# Patient Record
Sex: MALE | Race: OTHER
[De-identification: names, ages, dates, MRNs, and addresses within clinical notes are randomized per-mention and may not be internally consistent; named-entity substitution may affect disease eponyms.]

---

## 2019-06-21 ENCOUNTER — HOSPITAL ENCOUNTER (INPATIENT)
Dept: HOSPITAL 72 - EMR | Age: 49
LOS: 1 days | Discharge: HOME | DRG: 443 | End: 2019-06-22
Payer: SELF-PAY

## 2019-06-21 VITALS — SYSTOLIC BLOOD PRESSURE: 108 MMHG | DIASTOLIC BLOOD PRESSURE: 74 MMHG

## 2019-06-21 VITALS — DIASTOLIC BLOOD PRESSURE: 79 MMHG | SYSTOLIC BLOOD PRESSURE: 124 MMHG

## 2019-06-21 VITALS — DIASTOLIC BLOOD PRESSURE: 83 MMHG | SYSTOLIC BLOOD PRESSURE: 117 MMHG

## 2019-06-21 VITALS — WEIGHT: 150 LBS | HEIGHT: 64 IN | BODY MASS INDEX: 25.61 KG/M2

## 2019-06-21 VITALS — DIASTOLIC BLOOD PRESSURE: 80 MMHG | SYSTOLIC BLOOD PRESSURE: 128 MMHG

## 2019-06-21 VITALS — DIASTOLIC BLOOD PRESSURE: 76 MMHG | SYSTOLIC BLOOD PRESSURE: 135 MMHG

## 2019-06-21 DIAGNOSIS — Z88.0: ICD-10-CM

## 2019-06-21 DIAGNOSIS — E87.6: ICD-10-CM

## 2019-06-21 DIAGNOSIS — R53.1: ICD-10-CM

## 2019-06-21 DIAGNOSIS — K72.90: Primary | ICD-10-CM

## 2019-06-21 LAB
ADD MANUAL DIFF: NO
ALBUMIN SERPL-MCNC: 4 G/DL (ref 3.4–5)
ALBUMIN/GLOB SERPL: 1 {RATIO} (ref 1–2.7)
ALP SERPL-CCNC: 88 U/L (ref 46–116)
ALT SERPL-CCNC: 216 U/L (ref 12–78)
ANION GAP SERPL CALC-SCNC: 11 MMOL/L (ref 5–15)
APPEARANCE UR: CLEAR
APTT PPP: YELLOW S
AST SERPL-CCNC: 165 U/L (ref 15–37)
BASOPHILS NFR BLD AUTO: 0.9 % (ref 0–2)
BILIRUB SERPL-MCNC: 0.9 MG/DL (ref 0.2–1)
BUN SERPL-MCNC: 21 MG/DL (ref 7–18)
CALCIUM SERPL-MCNC: 9.4 MG/DL (ref 8.5–10.1)
CHLORIDE SERPL-SCNC: 94 MMOL/L (ref 98–107)
CO2 SERPL-SCNC: 28 MMOL/L (ref 21–32)
CREAT SERPL-MCNC: 0.8 MG/DL (ref 0.55–1.3)
EOSINOPHIL NFR BLD AUTO: 0.4 % (ref 0–3)
ERYTHROCYTE [DISTWIDTH] IN BLOOD BY AUTOMATED COUNT: 11.5 % (ref 11.6–14.8)
GLOBULIN SER-MCNC: 4.2 G/DL
GLUCOSE UR STRIP-MCNC: NEGATIVE MG/DL
HCT VFR BLD CALC: 42 % (ref 42–52)
HGB BLD-MCNC: 14.9 G/DL (ref 14.2–18)
KETONES UR QL STRIP: (no result)
LEUKOCYTE ESTERASE UR QL STRIP: (no result)
LYMPHOCYTES NFR BLD AUTO: 24 % (ref 20–45)
MCV RBC AUTO: 89 FL (ref 80–99)
MONOCYTES NFR BLD AUTO: 10.9 % (ref 1–10)
NEUTROPHILS NFR BLD AUTO: 63.7 % (ref 45–75)
NITRITE UR QL STRIP: NEGATIVE
PH UR STRIP: 6.5 [PH] (ref 4.5–8)
PLATELET # BLD: 332 K/UL (ref 150–450)
POTASSIUM SERPL-SCNC: 2.9 MMOL/L (ref 3.5–5.1)
PROT UR QL STRIP: (no result)
RBC # BLD AUTO: 4.73 M/UL (ref 4.7–6.1)
SODIUM SERPL-SCNC: 133 MMOL/L (ref 136–145)
SP GR UR STRIP: 1.01 (ref 1–1.03)
UROBILINOGEN UR-MCNC: 4 MG/DL (ref 0–1)
WBC # BLD AUTO: 9.8 K/UL (ref 4.8–10.8)

## 2019-06-21 PROCEDURE — 99285 EMERGENCY DEPT VISIT HI MDM: CPT

## 2019-06-21 PROCEDURE — 96365 THER/PROPH/DIAG IV INF INIT: CPT

## 2019-06-21 PROCEDURE — 84443 ASSAY THYROID STIM HORMONE: CPT

## 2019-06-21 PROCEDURE — 80329 ANALGESICS NON-OPIOID 1 OR 2: CPT

## 2019-06-21 PROCEDURE — 82140 ASSAY OF AMMONIA: CPT

## 2019-06-21 PROCEDURE — 93005 ELECTROCARDIOGRAM TRACING: CPT

## 2019-06-21 PROCEDURE — 80053 COMPREHEN METABOLIC PANEL: CPT

## 2019-06-21 PROCEDURE — 80307 DRUG TEST PRSMV CHEM ANLYZR: CPT

## 2019-06-21 PROCEDURE — 36415 COLL VENOUS BLD VENIPUNCTURE: CPT

## 2019-06-21 PROCEDURE — 70450 CT HEAD/BRAIN W/O DYE: CPT

## 2019-06-21 PROCEDURE — 81003 URINALYSIS AUTO W/O SCOPE: CPT

## 2019-06-21 PROCEDURE — 85025 COMPLETE CBC W/AUTO DIFF WBC: CPT

## 2019-06-21 NOTE — EMERGENCY ROOM REPORT
History of Present Illness


General


Chief Complaint:  Male Urogenital Problems


Source:  Patient, Friend





Present Illness


HPI


This patient is altered.  The patient is confabulating and hallucinating.  He 

is speaking normal words but not in a sensible order.  He is unable to make a 

complete sentence.  He has no specific complaint.  He is brought in by his 

friend who states that he had went to [place] and came home yesterday and 

called him and had been acting this way ever since.  He has no history of drug 

abuse or a psychiatric condition.  The friend of the patient states he is 

hallucinating and is not making sense.  There is been no recent illness.  He is 

unsure if he used drugs in [place].  He did not accompany him.  He states 

normally he has a normal mental status.


Allergies:  


Uncoded Allergies:  


     PENICILLIN (Allergy, Unknown, 6/21/19)





Patient History


Past Medical History:  unable to obtain


Past Surgical History:  unable to obtain


Pertinent Family History:  unable to obtain


Reviewed Nursing Documentation:  PMH: Agreed; PSxH: Agreed





Nursing Documentation-PMH


Past Medical History:  No Stated History





Review of Systems


All Other Systems:  limited





Physical Exam





Vital Signs








  Date Time  Temp Pulse Resp B/P (MAP) Pulse Ox O2 Delivery O2 Flow Rate FiO2


 


6/21/19 10:33 98.4 118 20 124/79 (94) 96 Room Air  








Sp02 EP Interpretation:  reviewed, normal


General Appearance:  no apparent distress, alert, GCS 15, non-toxic


Head:  normocephalic, atraumatic


Eyes:  bilateral eye normal inspection, bilateral eye PERRL


ENT:  hearing grossly normal, normal pharynx, no angioedema, normal voice


Neck:  full range of motion, supple/symm/no masses


Respiratory:  chest non-tender, lungs clear, normal breath sounds, no 

respiratory distress, no retraction, no accessory muscle use, speaking full 

sentences


Cardiovascular #1:  regular rate, rhythm, no edema


Gastrointestinal:  normal bowel sounds, non tender, soft, non-distended, no 

guarding, no rebound


Rectal:  deferred


Musculoskeletal:  back normal, gait/station normal, normal range of motion


Neurologic:  alert, responsive, motor strength/tone normal, sensory intact, 

speech normal, grossly normal


Psychiatric:  mood/affect normal, no suicidal/homicidal ideation, other - 

Psychosis, non-sensical.


Skin:  normal color, no rash, warm/dry, well hydrated





Medical Decision Making


Diagnostic Impression:  


 Primary Impression:  


 Hepatic encephalopathy


 Additional Impressions:  


 Hypokalemia


 Wernicke encephalopathy


ER Course


I suspect this patient has liver cirrhosis and possibly Warnicke 

encephalopathy.  I suspect he is altered secondary to an elevated ammonia and 

has hepatic encephalopathy.  Labs did show hypokalemia and an elevated ammonia.

  CT of the head was unremarkable.  This patient will be admitted for further 

evaluation of his altered mental status/encephalopathy.





Laboratory Tests








Test


  6/21/19


11:15 6/21/19


11:47


 


White Blood Count


  9.8 K/UL


(4.8-10.8) 


 


 


Red Blood Count


  4.73 M/UL


(4.70-6.10) 


 


 


Hemoglobin


  14.9 G/DL


(14.2-18.0) 


 


 


Hematocrit


  42.0 %


(42.0-52.0) 


 


 


Mean Corpuscular Volume 89 FL (80-99)   


 


Mean Corpuscular Hemoglobin


  31.4 PG


(27.0-31.0)  H 


 


 


Mean Corpuscular Hemoglobin


Concent 35.4 G/DL


(32.0-36.0) 


 


 


Red Cell Distribution Width


  11.5 %


(11.6-14.8)  L 


 


 


Platelet Count


  332 K/UL


(150-450) 


 


 


Mean Platelet Volume


  6.0 FL


(6.5-10.1)  L 


 


 


Neutrophils (%) (Auto)


  63.7 %


(45.0-75.0) 


 


 


Lymphocytes (%) (Auto)


  24.0 %


(20.0-45.0) 


 


 


Monocytes (%) (Auto)


  10.9 %


(1.0-10.0)  H 


 


 


Eosinophils (%) (Auto)


  0.4 %


(0.0-3.0) 


 


 


Basophils (%) (Auto)


  0.9 %


(0.0-2.0) 


 


 


Urine Color Yellow   


 


Urine Appearance Clear   


 


Urine pH 6.5 (4.5-8.0)   


 


Urine Specific Gravity


  1.010


(1.005-1.035) 


 


 


Urine Protein


  2+ (NEGATIVE)


H 


 


 


Urine Glucose (UA)


  Negative


(NEGATIVE) 


 


 


Urine Ketones


  1+ (NEGATIVE)


H 


 


 


Urine Blood


  1+ (NEGATIVE)


H 


 


 


Urine Nitrite


  Negative


(NEGATIVE) 


 


 


Urine Bilirubin


  1+ (NEGATIVE)


H 


 


 


Urine Ictotest


  Negative


(NEGATIVE) 


 


 


Urine Urobilinogen


  4 MG/DL


(0.0-1.0)  H 


 


 


Urine Leukocyte Esterase


  1+ (NEGATIVE)


H 


 


 


Urine RBC


  0-2 /HPF (0 -


0)  H 


 


 


Urine WBC


  0-2 /HPF (0 -


0) 


 


 


Urine Squamous Epithelial


Cells Occasional


/LPF 


 


 


Urine Amorphous Sediment


  Few /LPF


(NONE)  H 


 


 


Urine Bacteria


  Occasional


/HPF (NONE) 


 


 


Urine Mucus


  Few /LPF


(NONE/OCC)  H 


 


 


Sodium Level


  133 MMOL/L


(136-145)  L 


 


 


Potassium Level


  2.9 MMOL/L


(3.5-5.1)  L 


 


 


Chloride Level


  94 MMOL/L


()  L 


 


 


Carbon Dioxide Level


  28 MMOL/L


(21-32) 


 


 


Anion Gap


  11 mmol/L


(5-15) 


 


 


Blood Urea Nitrogen


  21 mg/dL


(7-18)  H 


 


 


Creatinine


  0.8 MG/DL


(0.55-1.30) 


 


 


Estimate Glomerular


Filtration Rate > 60 mL/min


(>60) 


 


 


Glucose Level


  139 MG/DL


()  H 


 


 


Calcium Level


  9.4 MG/DL


(8.5-10.1) 


 


 


Total Bilirubin


  0.9 MG/DL


(0.2-1.0) 


 


 


Aspartate Amino Transferase


(AST) 165 U/L


(15-37)  H 


 


 


Alanine Aminotransferase (ALT)


  216 U/L


(12-78)  H 


 


 


Alkaline Phosphatase


  88 U/L


() 


 


 


Total Protein


  8.2 G/DL


(6.4-8.2) 


 


 


Albumin


  4.0 G/DL


(3.4-5.0) 


 


 


Globulin 4.2 g/dL   


 


Albumin/Globulin Ratio 1.0 (1.0-2.7)   


 


Thyroid Stimulating Hormone


(TSH) 3.612 uiU/mL


(0.358-3.740) 


 


 


Salicylates Level


  < 0.2 ug/mL


(2.8-20)  L 


 


 


Urine Opiates Screen


  Negative


(NEGATIVE) 


 


 


Acetaminophen Level


  < 2 MCG/ML


(10-30)  L 


 


 


Urine Barbiturates Screen


  Negative


(NEGATIVE) 


 


 


Phencyclidine (PCP) Screen


  Negative


(NEGATIVE) 


 


 


Urine Amphetamines Screen


  Negative


(NEGATIVE) 


 


 


Urine Benzodiazepines Screen


  Negative


(NEGATIVE) 


 


 


Urine Cocaine Screen


  Negative


(NEGATIVE) 


 


 


Urine Marijuana (THC) Screen


  Negative


(NEGATIVE) 


 


 


Serum Alcohol < 3 mg/dL   


 


Ammonia


  


  38 umol/L


(11-32)  H








EKG Diagnostic Results


Rate:  tachycardiac


Rhythm:  other - S.tachycardia


ST Segments:  no acute changes


Other Impression


Qwave lead III.





Rhythm Strip Diag. Results


EP Interpretation:  yes


Rate:  100's


Rhythm:  no PVC's, no ectopy, other - S.tachycardia





CT/MRI/US Diagnostic Results


CT/MRI/US Diagnostic Results :  


   Imaging Test Ordered:  CT head


   Impression


Impression: Negative for acute intracranial bleed or mass effect


 


Right posterior temporal parenchymal calcification, consistent with old 

cysticercosis


 


Evidence of prior left mastoid surgery





Last Vital Signs








  Date Time  Temp Pulse Resp B/P (MAP) Pulse Ox O2 Delivery O2 Flow Rate FiO2


 


6/21/19 10:33 98.4 118 20 124/79 96 Room Air  








Disposition:  ADMITTED AS INPATIENT


Condition:  Serious


Referrals:  


NOT CHOSEN IPA/MD,REFERRING (PCP)











Irina Snider DO Jun 21, 2019 11:38

## 2019-06-21 NOTE — NUR
NURSE NOTES:



PATIENT RECEIVED FROM ER DEPT. VIA . FAMILY FRIEND AND ANCILLARY WORKER AT BEDSIDE. 
PATIENT ABLE TO TRANSFER SELF TO BED. ORIENTED TO ROOM. CALL LIGHT WITHIN REACH.BED IN 
LOWEST AND LOCKED POSITION. PLACED CALL TO LOO TO GET ADMITTING ORDERS. LEFT MESSAGE WITH 
PHONE EXCHANGE. AWAITING RETURN CALL.

## 2019-06-21 NOTE — NUR
ED Nurse Note:



PT IS STILL HALLUCINATING AND STATES THAT HE IS SEEING THE THINGS THAT MAKE 
MUSIC. FRIEND AT THE BED SIDE.

## 2019-06-21 NOTE — NUR
ED Nurse Note:



PT TRANSFERRED TO MED SURG UNTI VIA WHEELCHAIR WITH ER TECH. VSS. ALL BEONGINGS 
SENT.

## 2019-06-21 NOTE — DIAGNOSTIC IMAGING REPORT
Indications: Altered mental status

 

Technique: Spiral acquisitions obtained through the brain. Angled axial and coronal 5

x 5 mm slices were reconstructed. Total dose length product 1375.61 mGycm.  CTDI

vol(s) 70.38 mGy. Dose reduction achieved using automated exposure control

 

Comparison: None.

 

Findings: There is a parenchymal calcification in the right posterior temporal lobe.

No acute intracranial hemorrhage nor edema. No mass effect nor midline shift. Normal

gray-white differentiation. Normal-sized ventricles and extra axial CSF spaces. There

is evidence of prior mastoid surgery on the left. There is some mastoid opacification

on the left. The orbits are unremarkable. The sinuses are clear.

 

Impression: Negative for acute intracranial bleed or mass effect

 

Right posterior temporal parenchymal calcification, consistent with old cysticercosis

 

Evidence of prior left mastoid surgery

 

 

 

 

 

The CT scanner at [facility] is accredited by the American College of

Radiology and the scans are performed using protocols designed to limit radiation

exposure to as low as reasonably achievable to attain images of sufficient resolution

adequate for diagnostic evaluation.

## 2019-06-21 NOTE — NUR
ED Nurse Note:



PT WALKED IN DUE TO GENITAL PAIN SINCE LAST NIGHT. PT ALSO C/O THAT HE FEELS 
MUSIC ALL OVER HIS BODY AND THAT THERE WAS A SPIRIT IN HIS ROOM LAST NIGHT. 
ELIA HORNER X4, AMBULATORY WITH UNLABORED BREATHING. 


-------------------------------------------------------------------------------

Addendum: 06/21/19 at 1106 by ARGELIA

-------------------------------------------------------------------------------

ED Nurse Note:



PT WALKED IN DUE TO GENITAL PAIN SINCE LAST NIGHT. PT ALSO C/O THAT HE FEELS 
MUSIC ALL OVER HIS BODY AND THAT THERE WAS A SPIRIT IN HIS ROOM LAST NIGHT. 
MADELEINE HORNER X4, AMBULATORY WITH UNLABORED BREATHING.

## 2019-06-21 NOTE — NUR
NURSE NOTES:

Called & let msg to Dr. London to f/u with admission orders. Also informed MD that current 
HR is 114.

## 2019-06-21 NOTE — NUR
NURSE NOTES:

Received report & pt from ÓSCAR Garcia. Pt lying in bed, a&ox4, [place]er speaking only, in 
room air. No s/s of acute distress & no c/o pain at this time. IV site intact & S/L'd. No 
admission orderes yet, AM shift already called Dr. London & left msg. Will call Dr. London 
to f/u with admission orders again. Per AM shift, pt tachycardic ranging from 118-130s. Bed 
in lowest position, call light within reach. Will continue to monitor.

## 2019-06-22 VITALS — DIASTOLIC BLOOD PRESSURE: 70 MMHG | SYSTOLIC BLOOD PRESSURE: 105 MMHG

## 2019-06-22 VITALS — SYSTOLIC BLOOD PRESSURE: 114 MMHG | DIASTOLIC BLOOD PRESSURE: 75 MMHG

## 2019-06-22 VITALS — DIASTOLIC BLOOD PRESSURE: 76 MMHG | SYSTOLIC BLOOD PRESSURE: 106 MMHG

## 2019-06-22 VITALS — DIASTOLIC BLOOD PRESSURE: 70 MMHG | SYSTOLIC BLOOD PRESSURE: 114 MMHG

## 2019-06-22 VITALS — SYSTOLIC BLOOD PRESSURE: 107 MMHG | DIASTOLIC BLOOD PRESSURE: 65 MMHG

## 2019-06-22 NOTE — NUR
CASE MANAGEMENT: INITIAL REVIEW 



47 YO M PRESENTED TO OUR ED FROM HOME 



CC: MALE UROGENITAL PROBLEMS



PMHx: NONE STATED 



SI:HEPATIC ENCEPHALOPATHY. 

T 98.4  RR 20 B/P 124/79 SATS 96% ON RA 

 K 2.9 CL 94 BUN 21    AMMONIA 38 



IS: ZYPREXA PO X1 

LACTULOSE PO X1 

CT HEAD (-)

 

***PATIENT ADMITTED TO MED/SURG 6/21/2019 @ 1500***** 



DCP: PATIENT TO BE DISCHARGED TO HOME ONCE MEDICALLY CLEARED. 






-------------------------------------------------------------------------------

Addendum: 06/22/19 at 1804 by Mily Angel CM

-------------------------------------------------------------------------------

INTERQUAL MET

## 2019-06-22 NOTE — HISTORY AND PHYSICAL REPORT
DATE OF ADMISSION:  06/21/2019

HISTORY OF PRESENT ILLNESS:  This is a 48-year-old male who came to the

emergency room for altered mental status, weakness, hepatic

encephalopathy.



PAST MEDICAL HISTORY:  Alcohol abuse.



ALLERGIES:  NKA.



MEDICATIONS:  See the list.



PHYSICAL EXAMINATION:

GENERAL:  This is a young male who is currently confused.

VITAL SIGNS:  Blood pressure 106/76, pulse 111, temperature 98.3.

HEENT:  NAD.

CHEST:  Bilaterally clear.

CARDIOVASCULAR:  Regular rhythm.  No gallop.  No murmur.

ABDOMEN:  Soft.

EXTREMITIES:  CCE.

NEUROLOGICAL:  The patient is lying in the bed, generalized weakness.



LABORATORY AND DIAGNOSTIC DATA:  White count 9.8, hemoglobin 15, hematocrit

42.  Chemistry panel, ammonia level was 38.  Sodium 133, potassium 2.9,

BUN 21, creatinine 0.8, glucose, AST, and ALT is high.  TSH 3.612.  His

urine 1+ ketones, 1+ blood.  Toxicology report is showing as negative.



ASSESSMENT:

1. Hepatic encephalopathy.

2. Generalized weakness.

3. Hypokalemia.



PLAN:

1. We will admit on medical floor.

2. ______ .

3. I will discontinue Tylenol, continue banana bag.

4. Consider GI consult.









  ______________________________________________

  Ibrahima London M.D.





DR:  Juan

D:  06/22/2019 17:09

T:  06/22/2019 21:37

JOB#:  [id number]

CC:

## 2019-06-22 NOTE — NUR
NURSE NOTES:BEDSIDE ROUNDS WITH KOTA RN.PATIENT SITTING AT THE SIDE OF THE BED EATING 
BREAKFAST,A/O X2,ROOM AIR,EXTREMITIES MOBILE,NO C/O PAIN.

## 2019-06-22 NOTE — NUR
NURSE NOTES:

Discharge instructions and prescription x1 reviewed with patient, verbalized understanding. 
RAC IV discontinued, no active bleeding. All belongings, discharge instructions and 
prescription given to patient. Patient ambulated down to lobby with family in stable 
condition. Discharged home at 1835.

## 2019-06-22 NOTE — NUR
NURSE NOTES:

Report received from Arlene RN, rounds made. Patient resting in supine position in bed. Alert, 
oriented x2. Denies SOB on RA, no pain, no NV. RAC heplock intact, site asymptomatic. Family 
at bedside. Call light in reach, bed in lowest position, will continue to monitor.

## 2019-06-22 NOTE — CARDIOLOGY REPORT
--------------- APPROVED REPORT --------------





EKG Measurement

Heart [id number]

OR 124P0

[id number]

[id number]

CTd260





Sinus tachycardia

Inferior infarct, age undetermined

Abnormal ECG

## 2019-06-26 NOTE — DISCHARGE SUMMARY
Discharge Summary


Discharge Summary


_


DATE OF ADMISSION: 6/21/2019





DATE OF DISCHARGE: 6/22/2019





DISCHARGED BY: Dr. London





REASON FOR ADMISSION: 


48 years old male presented to emergency room with altered mental status


Upon evaluation patient was slightly tachycardic.


Laboratory work-up revealed no leukocytosis, stable hemoglobin and hematocrit.


Potassium 2.9.


Glucose 139.


.  .  TSH within normal limits.


Urine toxicology screen was negative.


Serum salicylates and alcohol levels negative.


Ammonia- 38.


EKG revealed sinus tachycardia, no acute ischemic changes.


CT of the head revealed no evidence of acute intracranial bleeding or mass-

effect.  Right posterior temporal parenchymal calcification consistent with 

cysticercosis.


Patient was diagnosed with hepatic encephalopathy, hypokalemia, and admitted 

for further management.





 


 


HOSPITAL COURSE: 


Patient admitted to medical surgical floor.  


Patient started on the IV fluids with thiamine and folic acid.  


Potassium was replaced.  


Lactulose given.  


GI prophylaxis provided.  


Tylenol stopped due to elevated LFT.


Mental status improved.


Patient was cleared for discharge home.


Outpatient follow-up with primary care provider


Due to rapid and unexpected improvement patient condition, patient was 

discharged in 1 day.   





FINAL DIAGNOSES: 


Hepatic encephalopathy


Generalized weakness


Hypokalemia





DISCHARGE MEDICATIONS:


See Medication Reconciliation list.





DISCHARGE INSTRUCTIONS:


Patient was discharged home.


Follow up with primary care provider in one week.








I have been assigned to dictate discharge summary for this account. 


I was not involved in the patient's management.











Chasidy Fernandes NP Jun 26, 2019 11:28

## 2020-06-13 ENCOUNTER — HOSPITAL ENCOUNTER (INPATIENT)
Dept: HOSPITAL 72 - EMR | Age: 50
LOS: 2 days | Discharge: HOME | End: 2020-06-15
Payer: MEDICAID

## 2020-06-13 VITALS — SYSTOLIC BLOOD PRESSURE: 110 MMHG | DIASTOLIC BLOOD PRESSURE: 77 MMHG

## 2020-06-13 VITALS — DIASTOLIC BLOOD PRESSURE: 74 MMHG | SYSTOLIC BLOOD PRESSURE: 124 MMHG

## 2020-06-13 VITALS — HEIGHT: 68 IN | BODY MASS INDEX: 25.76 KG/M2 | WEIGHT: 170 LBS

## 2020-06-13 VITALS — SYSTOLIC BLOOD PRESSURE: 128 MMHG | DIASTOLIC BLOOD PRESSURE: 92 MMHG

## 2020-06-13 DIAGNOSIS — E87.2: ICD-10-CM

## 2020-06-13 DIAGNOSIS — F10.239: Primary | ICD-10-CM

## 2020-06-13 DIAGNOSIS — E87.6: ICD-10-CM

## 2020-06-13 DIAGNOSIS — J18.9: ICD-10-CM

## 2020-06-13 DIAGNOSIS — Z91.81: ICD-10-CM

## 2020-06-13 DIAGNOSIS — Z88.0: ICD-10-CM

## 2020-06-13 LAB
ADD MANUAL DIFF: NO
ALBUMIN SERPL-MCNC: 3.5 G/DL (ref 3.4–5)
ALBUMIN/GLOB SERPL: 0.8 {RATIO} (ref 1–2.7)
ALP SERPL-CCNC: 61 U/L (ref 46–116)
ALT SERPL-CCNC: 56 U/L (ref 12–78)
ANION GAP SERPL CALC-SCNC: 18 MMOL/L (ref 5–15)
APPEARANCE UR: CLEAR
APTT BLD: 27 SEC (ref 23–33)
APTT PPP: (no result) S
AST SERPL-CCNC: 38 U/L (ref 15–37)
BASOPHILS NFR BLD AUTO: 1.2 % (ref 0–2)
BILIRUB SERPL-MCNC: 0.2 MG/DL (ref 0.2–1)
BUN SERPL-MCNC: 6 MG/DL (ref 7–18)
CALCIUM SERPL-MCNC: 8.1 MG/DL (ref 8.5–10.1)
CHLORIDE SERPL-SCNC: 102 MMOL/L (ref 98–107)
CO2 SERPL-SCNC: 20 MMOL/L (ref 21–32)
CREAT SERPL-MCNC: 0.9 MG/DL (ref 0.55–1.3)
EOSINOPHIL NFR BLD AUTO: 2.4 % (ref 0–3)
ERYTHROCYTE [DISTWIDTH] IN BLOOD BY AUTOMATED COUNT: 13.3 % (ref 11.6–14.8)
GLOBULIN SER-MCNC: 4.5 G/DL
GLUCOSE UR STRIP-MCNC: NEGATIVE MG/DL
HCT VFR BLD CALC: 51.3 % (ref 42–52)
HGB BLD-MCNC: 16.8 G/DL (ref 14.2–18)
INR PPP: 1 (ref 0.9–1.1)
KETONES UR QL STRIP: NEGATIVE
LEUKOCYTE ESTERASE UR QL STRIP: NEGATIVE
LYMPHOCYTES NFR BLD AUTO: 41.6 % (ref 20–45)
MCV RBC AUTO: 96 FL (ref 80–99)
MONOCYTES NFR BLD AUTO: 5.5 % (ref 1–10)
NEUTROPHILS NFR BLD AUTO: 49.4 % (ref 45–75)
NITRITE UR QL STRIP: NEGATIVE
PH UR STRIP: 5 [PH] (ref 4.5–8)
PLATELET # BLD: 510 K/UL (ref 150–450)
POTASSIUM SERPL-SCNC: 3 MMOL/L (ref 3.5–5.1)
PROT UR QL STRIP: NEGATIVE
RBC # BLD AUTO: 5.34 M/UL (ref 4.7–6.1)
SODIUM SERPL-SCNC: 140 MMOL/L (ref 136–145)
SP GR UR STRIP: 1.01 (ref 1–1.03)
UROBILINOGEN UR-MCNC: NORMAL MG/DL (ref 0–1)
WBC # BLD AUTO: 6.9 K/UL (ref 4.8–10.8)

## 2020-06-13 PROCEDURE — 99285 EMERGENCY DEPT VISIT HI MDM: CPT

## 2020-06-13 PROCEDURE — 81003 URINALYSIS AUTO W/O SCOPE: CPT

## 2020-06-13 PROCEDURE — 83605 ASSAY OF LACTIC ACID: CPT

## 2020-06-13 PROCEDURE — 70450 CT HEAD/BRAIN W/O DYE: CPT

## 2020-06-13 PROCEDURE — 96376 TX/PRO/DX INJ SAME DRUG ADON: CPT

## 2020-06-13 PROCEDURE — 85730 THROMBOPLASTIN TIME PARTIAL: CPT

## 2020-06-13 PROCEDURE — 84484 ASSAY OF TROPONIN QUANT: CPT

## 2020-06-13 PROCEDURE — 96365 THER/PROPH/DIAG IV INF INIT: CPT

## 2020-06-13 PROCEDURE — 74018 RADEX ABDOMEN 1 VIEW: CPT

## 2020-06-13 PROCEDURE — 82803 BLOOD GASES ANY COMBINATION: CPT

## 2020-06-13 PROCEDURE — 80053 COMPREHEN METABOLIC PANEL: CPT

## 2020-06-13 PROCEDURE — 93005 ELECTROCARDIOGRAM TRACING: CPT

## 2020-06-13 PROCEDURE — 96361 HYDRATE IV INFUSION ADD-ON: CPT

## 2020-06-13 PROCEDURE — 85610 PROTHROMBIN TIME: CPT

## 2020-06-13 PROCEDURE — 85025 COMPLETE CBC W/AUTO DIFF WBC: CPT

## 2020-06-13 PROCEDURE — 71045 X-RAY EXAM CHEST 1 VIEW: CPT

## 2020-06-13 PROCEDURE — 83735 ASSAY OF MAGNESIUM: CPT

## 2020-06-13 PROCEDURE — 83690 ASSAY OF LIPASE: CPT

## 2020-06-13 PROCEDURE — 36415 COLL VENOUS BLD VENIPUNCTURE: CPT

## 2020-06-13 PROCEDURE — 96367 TX/PROPH/DG ADDL SEQ IV INF: CPT

## 2020-06-13 NOTE — NUR
NURSE NOTES:

Received report from RADHA Khoury RN. Pt is in stable condition, denies pain. Will start plan 
of care and close monitoring.

## 2020-06-13 NOTE — NUR
TRANSFER TO FLOOR:



Patient transferred to telemetry as ordered, per ERMD. Report given to ÓSCAR Hernandez. Patient transported via gurney on ACLS protocol with cardiac monitor 
accompanied by 1 RN and ER tech in stable condition.

## 2020-06-13 NOTE — EMERGENCY ROOM REPORT
History of Present Illness


General


Chief Complaint:  Alcohol Intoxication


Source:  Patient, EMS





Present Illness


HPI


Is a 49-year-old male past medical history of alcohol abuse who is brought in 

by EMS for alcohol abuse.  Patient's family called EMS because patient has been 

drinking daily and they are concerned about his nutritional status because he 

drinks more than he eats.  Patient admits to drinking 12 beers a day as well as 

drinking tequila.  He states his last drink was earlier this morning where he 

drank some tequila.  He feels anxious and has palpitations.  He states that he 

feels a little shaky.  He denies any seizure-like activity.  Patient also 

states that he fell yesterday.  He states that he fell because he was drinking 

and hit his head.  Patient denies any chest pain or shortness of breath.  He 

denies any focal weakness.


Allergies:  


Coded Allergies:  


     PENICILLINS (Unverified  Allergy, Unknown, 6/13/20)


Uncoded Allergies:  


     PENICILLIN (Allergy, Unknown, 6/21/19)





COVID-19 Screening


Contact w/high risk pt:  No


Recent Travel to affected area:  No


Experienced COVID-19 symptoms?:  No


COVID-19 Testing performed PTA:  No





Patient History


Social History:  Reports: alcohol use; Denies: smoking, drug use


Reviewed Nursing Documentation:  PMH: Agreed; PSxH: Agreed





Nursing Documentation-PMH


Past Medical History:  No Stated History





Review of Systems


All Other Systems:  negative except mentioned in HPI





Physical Exam





Vital Signs








  Date Time  Temp Pulse Resp B/P (MAP) Pulse Ox O2 Delivery O2 Flow Rate FiO2


 


6/13/20 17:13 98.1 120 20 127/73 (91) 99 Room Air  








Sp02 EP Interpretation:  reviewed, normal


General Appearance:  no apparent distress, alert, GCS 15, non-toxic


Head:  normocephalic, atraumatic


Eyes:  bilateral eye normal inspection, bilateral eye PERRL


ENT:  hearing grossly normal, normal pharynx, no angioedema, normal voice, dry 

mucus membranes


Neck:  full range of motion, supple/symm/no masses


Respiratory:  chest non-tender, lungs clear, normal breath sounds, speaking 

full sentences


Cardiovascular #1:  tachycardia


Cardiovascular #2:  2+ carotid (R), 2+ carotid (L)


Gastrointestinal:  normal bowel sounds, non tender, soft, non-distended, no 

guarding, no rebound


Rectal:  deferred


Genitourinary:  no CVA tenderness


Musculoskeletal:  normal inspection, normal range of motion


Neurologic:  CNs III-XII nml as tested, oriented x3, other - faint tremors


Psychiatric:  no suicidal/homicidal ideation


Skin:  no rash


Lymphatic:  no adenopathy





Medical Decision Making


Diagnostic Impression:  


 Primary Impression:  


 Alcoholic ketoacidosis


 Additional Impressions:  


 Hypokalemia


 Alcohol withdrawal


ER Course


Patient presents with alcohol withdrawal.  He is tachycardic and hypertensive 

he is not altered.  No evidence for DTs.  Patient given IV fluids, banana bag 

and 2 doses of IV Ativan.  His heart rate is improving currently at 6:46 PM his 

heart rate is 105 bpm.  Patient also found to be hypokalemic with a potassium 

of 3.0.  I have ordered for 10 mEq of IV potassium as well as 40 mEq of oral 

potassium chloride.  Patient has anion gap of 18 and CO2 of 20.  Alcoholic 

ketoacidosis.  Patient to be admitted for further treatment and evaluation.





Laboratory Tests








Test


  6/13/20


17:20 6/13/20


18:29


 


White Blood Count


  6.9 K/UL


(4.8-10.8) 


 


 


Red Blood Count


  5.34 M/UL


(4.70-6.10) 


 


 


Hemoglobin


  16.8 G/DL


(14.2-18.0) 


 


 


Hematocrit


  51.3 %


(42.0-52.0) 


 


 


Mean Corpuscular Volume 96 FL (80-99)   


 


Mean Corpuscular Hemoglobin


  31.5 PG


(27.0-31.0)  H 


 


 


Mean Corpuscular Hemoglobin


Concent 32.9 G/DL


(32.0-36.0) 


 


 


Red Cell Distribution Width


  13.3 %


(11.6-14.8) 


 


 


Platelet Count


  510 K/UL


(150-450)  H 


 


 


Mean Platelet Volume


  5.7 FL


(6.5-10.1)  L 


 


 


Neutrophils (%) (Auto)


  49.4 %


(45.0-75.0) 


 


 


Lymphocytes (%) (Auto)


  41.6 %


(20.0-45.0) 


 


 


Monocytes (%) (Auto)


  5.5 %


(1.0-10.0) 


 


 


Eosinophils (%) (Auto)


  2.4 %


(0.0-3.0) 


 


 


Basophils (%) (Auto)


  1.2 %


(0.0-2.0) 


 


 


Prothrombin Time


  10.7 SEC


(9.30-11.50) 


 


 


Prothrombin Time INR 1.0 (0.9-1.1)   


 


Activated Partial


Thromboplast Time 27 SEC (23-33)


  


 


 


Sodium Level


  140 MMOL/L


(136-145) 


 


 


Potassium Level


  3.0 MMOL/L


(3.5-5.1)  L 


 


 


Chloride Level


  102 MMOL/L


() 


 


 


Carbon Dioxide Level


  20 MMOL/L


(21-32)  L 


 


 


Anion Gap


  18 mmol/L


(5-15)  H 


 


 


Blood Urea Nitrogen


  6 mg/dL (7-18)


L 


 


 


Creatinine


  0.9 MG/DL


(0.55-1.30) 


 


 


Estimated Glomerular


Filtration Rate > 60 mL/min


(>60) 


 


 


Glucose Level


  131 MG/DL


()  H 


 


 


Calcium Level


  8.1 MG/DL


(8.5-10.1)  L 


 


 


Magnesium Level


  2.0 MG/DL


(1.8-2.4) 


 


 


Total Bilirubin


  0.2 MG/DL


(0.2-1.0) 


 


 


Aspartate Amino Transferase


(AST) 38 U/L (15-37)


H 


 


 


Alanine Aminotransferase (ALT)


  56 U/L (12-78)


  


 


 


Alkaline Phosphatase


  61 U/L


() 


 


 


Troponin I


  0.002 ng/mL


(0.000-0.056) 


 


 


Total Protein


  8.0 G/DL


(6.4-8.2) 


 


 


Albumin


  3.5 G/DL


(3.4-5.0) 


 


 


Globulin 4.5 g/dL   


 


Albumin/Globulin Ratio


  0.8 (1.0-2.7)


L 


 


 


Lipase


  138 U/L


() 


 


 


Venous Blood pH  Pending  


 


Venous Blood Partial Pressure


CO2 


  Pending  


 


 


Venous Blood Partial Pressure


O2 


  Pending  


 


 


Venous Blood HCO3  Pending  


 


Venous Blood Total Carbon


Dioxide 


  Pending  


 


 


Venous Blood Base Excess  Pending  


 


Venous Blood Carboxyhemoglobin  Pending  


 


Methemoglobin  Pending  








EKG Diagnostic Results


EKG Time:  18:15


EP Interpretation:  Amisha De Santiago MD


Rate:  tachycardiac


Rhythm:  other - sinus tachycardia


ST Segments:  no acute changes


ASA given to the pt in ED:  No





Rhythm Strip Diag. Results


Rhythm Strip Time:  18:15


EP Interpretation:  yes - MD Jonnathan


Rate:  111


Rhythm:  no PVC's, no ectopy, other - sinus tachycardia





Other X-Ray Diagnostic Results


Other X-Ray Diagnostic Results :  


   X-Ray ordered:  x-ray abdomen


   # of Views/Limited Vs Complete:  2 View


   Indication:  Other - alcohol abuse


   EP Interpretation:  Yes


   Interpretation:  no soft tissue swelling, nonspecific bowel gas, no sbo


   Impression:  No acute disease


   Electronically Signed by:  Amisha De Santiago MD





Last Vital Signs








  Date Time  Temp Pulse Resp B/P (MAP) Pulse Ox O2 Delivery O2 Flow Rate FiO2


 


6/13/20 17:13 98.1 120 20 127/73 (91) 99 Room Air  








Disposition:  ADMITTED AS INPATIENT


Condition:  Critical - telemetry


Physician Consult:  Amisha Oscar M.D. Jun 13, 2020 17:21

## 2020-06-13 NOTE — NUR
ED Nurse Note:



Received report from ÓSCAR Becerra. Patient sleeping in bed, no acute distress 
noted.

## 2020-06-13 NOTE — DIAGNOSTIC IMAGING REPORT
EXAM:

  XR Chest, 1 View

 

CLINICAL HISTORY:

  PAIN

 

TECHNIQUE:

  Frontal view of the chest.

 

COMPARISON:

  No relevant prior studies available.

 

FINDINGS:

  Lungs:  Atelectasis versus airspace opacity in the right infrahilar 

region.

  Pleural space:  No pleural effusion. No pneumothorax.

  Heart:  Unremarkable.  No cardiomegaly.

  Bones/joints:  Unremarkable.

 

IMPRESSION:     

  Atelectasis versus airspace opacity in the right infrahilar region.

## 2020-06-13 NOTE — NUR
ED Nurse Note: Pt brought in by ambulance from home d/t ETOH use and feeling 
anxious. Per pt, he drank a bottle of tequila this morning. Respirations even 
and unlabored on room air. Vitals stable as documented. A+Ox4, calm and 
cooperative.

## 2020-06-13 NOTE — HISTORY & PHYSICAL
History and Physical


History & Physicial


History and Physical





HPI


Patient is a 49-year-old man with past medical history of alcohol abuse 

admitted for with possible alcohol withdrawal, family concern about amount he 

is drinking. He admits to drinking 12 beers a day as well as drinking tequila.  

He states his last drink was earlier this morning where he drank some tequila.  

He feels anxious and has palpitations.  He states that he feels a little shaky.

  He denies any seizure-like activity.  Patient also states that he fell 

yesterday.  He states that he fell because he was drinking and hit his head.  

Patient denies any chest pain or shortness of breath.  He denies any focal 

weakness.





Allergies:  


Coded Allergies:  


     PENICILLINS (Unverified  Allergy, Unknown, 6/13/20)


Uncoded Allergies:  


     PENICILLIN (Allergy, Unknown, 6/21/19)





Social History:  Reports: alcohol use; Denies: smoking, drug use





FH:NC





Past Medical History:  No Stated History





All Other Systems:  negative except mentioned in HPI





Physical Exam





Vital Signs Noted








  Date Time  Temp Pulse Resp B/P (MAP) Pulse Ox O2 Delivery O2 Flow Rate FiO2


 


6/13/20 17:13 98.1 120 20 127/73 (91) 99 Room Air  








General Appearance:  no apparent distress, alert, GCS 15, non-toxic


Head:  normocephalic, atraumatic


Eyes:  bilateral eye normal inspection, bilateral eye PERRL


ENT:  hearing grossly normal, normal pharynx, normal voice, dry mucus membranes


Neck:  full range of motion, supple/symm/no masses/no LN


Respiratory:  chest non-tender, lungs clear, normal breath sounds, speaking 

full sentences


Cardiovascular:  tachycardia, HS1, HS2 normal, RRR


Gastrointestinal:  normal bowel sounds, non tender, soft, non-distended, no 

guarding, no rebound, no CVA tenderness


Musculoskeletal:  normal inspection, normal range of motion


Neurologic:  CNs III-XII nml as tested, oriented x3, other - faint tremors


Skin:  no rash





Impression:  


 Alcoholic withdrawal,ketoacidosis


 Hypokalemia


 Possible Pneumonia


 


Plan


IV Antibiotics


Thiamine


IV fluids, banana bag


PRN  Ativan


Monitor labs


PPX


O2 PRN





Laboratory Tests








Test


  6/13/20


17:20 6/13/20


18:29


 


White Blood Count


  6.9 K/UL


(4.8-10.8) 


 


 


Red Blood Count


  5.34 M/UL


(4.70-6.10) 


 


 


Hemoglobin


  16.8 G/DL


(14.2-18.0) 


 


 


Hematocrit


  51.3 %


(42.0-52.0) 


 


 


Mean Corpuscular Volume 96 FL (80-99)   


 


Mean Corpuscular Hemoglobin


  31.5 PG


(27.0-31.0)  H 


 


 


Mean Corpuscular Hemoglobin


Concent 32.9 G/DL


(32.0-36.0) 


 


 


Red Cell Distribution Width


  13.3 %


(11.6-14.8) 


 


 


Platelet Count


  510 K/UL


(150-450)  H 


 


 


Mean Platelet Volume


  5.7 FL


(6.5-10.1)  L 


 


 


Neutrophils (%) (Auto)


  49.4 %


(45.0-75.0) 


 


 


Lymphocytes (%) (Auto)


  41.6 %


(20.0-45.0) 


 


 


Monocytes (%) (Auto)


  5.5 %


(1.0-10.0) 


 


 


Eosinophils (%) (Auto)


  2.4 %


(0.0-3.0) 


 


 


Basophils (%) (Auto)


  1.2 %


(0.0-2.0) 


 


 


Prothrombin Time


  10.7 SEC


(9.30-11.50) 


 


 


Prothrombin Time INR 1.0 (0.9-1.1)   


 


Activated Partial


Thromboplast Time 27 SEC (23-33)


  


 


 


Sodium Level


  140 MMOL/L


(136-145) 


 


 


Potassium Level


  3.0 MMOL/L


(3.5-5.1)  L 


 


 


Chloride Level


  102 MMOL/L


() 


 


 


Carbon Dioxide Level


  20 MMOL/L


(21-32)  L 


 


 


Anion Gap


  18 mmol/L


(5-15)  H 


 


 


Blood Urea Nitrogen


  6 mg/dL (7-18)


L 


 


 


Creatinine


  0.9 MG/DL


(0.55-1.30) 


 


 


Estimated Glomerular


Filtration Rate > 60 mL/min


(>60) 


 


 


Glucose Level


  131 MG/DL


()  H 


 


 


Calcium Level


  8.1 MG/DL


(8.5-10.1)  L 


 


 


Magnesium Level


  2.0 MG/DL


(1.8-2.4) 


 


 


Total Bilirubin


  0.2 MG/DL


(0.2-1.0) 


 


 


Aspartate Amino Transferase


(AST) 38 U/L (15-37)


H 


 


 


Alanine Aminotransferase (ALT)


  56 U/L (12-78)


  


 


 


Alkaline Phosphatase


  61 U/L


() 


 


 


Troponin I


  0.002 ng/mL


(0.000-0.056) 


 


 


Total Protein


  8.0 G/DL


(6.4-8.2) 


 


 


Albumin


  3.5 G/DL


(3.4-5.0) 


 


 


Globulin 4.5 g/dL   


 


Albumin/Globulin Ratio


  0.8 (1.0-2.7)


L 


 


 


Lipase


  138 U/L


() 


 


 


Venous Blood pH  Pending  


 


Venous Blood Partial Pressure


CO2 


  Pending  


 


 


Venous Blood Partial Pressure


O2 


  Pending  


 


 


Venous Blood HCO3  Pending  


 


Venous Blood Total Carbon


Dioxide 


  Pending  


 


 


Venous Blood Base Excess  Pending  


 


Venous Blood Carboxyhemoglobin  Pending  


 


Methemoglobin  Pending  








EKG:  sinus tachycardiac, no acute changes





Rhythm:  no PVC's, no ectopy, other - sinus tachycardia





AXR:  no soft tissue swelling, nonspecific bowel gas, no sbo


   Impression:  No acute disease





CXR: R Infrahilar atelectasis vs opacity











Curtis Brown MD Jun 13, 2020 20:42

## 2020-06-14 VITALS — DIASTOLIC BLOOD PRESSURE: 87 MMHG | SYSTOLIC BLOOD PRESSURE: 147 MMHG

## 2020-06-14 VITALS — DIASTOLIC BLOOD PRESSURE: 83 MMHG | SYSTOLIC BLOOD PRESSURE: 130 MMHG

## 2020-06-14 VITALS — SYSTOLIC BLOOD PRESSURE: 125 MMHG | DIASTOLIC BLOOD PRESSURE: 84 MMHG

## 2020-06-14 VITALS — SYSTOLIC BLOOD PRESSURE: 142 MMHG | DIASTOLIC BLOOD PRESSURE: 92 MMHG

## 2020-06-14 VITALS — SYSTOLIC BLOOD PRESSURE: 147 MMHG | DIASTOLIC BLOOD PRESSURE: 96 MMHG

## 2020-06-14 VITALS — SYSTOLIC BLOOD PRESSURE: 132 MMHG | DIASTOLIC BLOOD PRESSURE: 89 MMHG

## 2020-06-14 LAB
ADD MANUAL DIFF: NO
ALBUMIN SERPL-MCNC: 3.3 G/DL (ref 3.4–5)
ALBUMIN/GLOB SERPL: 0.8 {RATIO} (ref 1–2.7)
ALP SERPL-CCNC: 56 U/L (ref 46–116)
ALT SERPL-CCNC: 52 U/L (ref 12–78)
ANION GAP SERPL CALC-SCNC: 14 MMOL/L (ref 5–15)
AST SERPL-CCNC: 36 U/L (ref 15–37)
BASOPHILS NFR BLD AUTO: 0.9 % (ref 0–2)
BILIRUB SERPL-MCNC: 0.4 MG/DL (ref 0.2–1)
BUN SERPL-MCNC: 9 MG/DL (ref 7–18)
CALCIUM SERPL-MCNC: 7.7 MG/DL (ref 8.5–10.1)
CHLORIDE SERPL-SCNC: 104 MMOL/L (ref 98–107)
CO2 SERPL-SCNC: 21 MMOL/L (ref 21–32)
CREAT SERPL-MCNC: 1 MG/DL (ref 0.55–1.3)
EOSINOPHIL NFR BLD AUTO: 1.3 % (ref 0–3)
ERYTHROCYTE [DISTWIDTH] IN BLOOD BY AUTOMATED COUNT: 12.9 % (ref 11.6–14.8)
GLOBULIN SER-MCNC: 4.1 G/DL
HCT VFR BLD CALC: 49.5 % (ref 42–52)
HGB BLD-MCNC: 15.7 G/DL (ref 14.2–18)
LYMPHOCYTES NFR BLD AUTO: 18.4 % (ref 20–45)
MCV RBC AUTO: 98 FL (ref 80–99)
MONOCYTES NFR BLD AUTO: 5.4 % (ref 1–10)
NEUTROPHILS NFR BLD AUTO: 74 % (ref 45–75)
PLATELET # BLD: 448 K/UL (ref 150–450)
POTASSIUM SERPL-SCNC: 4.2 MMOL/L (ref 3.5–5.1)
RBC # BLD AUTO: 5.04 M/UL (ref 4.7–6.1)
SODIUM SERPL-SCNC: 139 MMOL/L (ref 136–145)
WBC # BLD AUTO: 10.4 K/UL (ref 4.8–10.8)

## 2020-06-14 RX ADMIN — HEPARIN SODIUM SCH UNITS: 5000 INJECTION INTRAVENOUS; SUBCUTANEOUS at 20:50

## 2020-06-14 RX ADMIN — HEPARIN SODIUM SCH UNITS: 5000 INJECTION INTRAVENOUS; SUBCUTANEOUS at 08:55

## 2020-06-14 RX ADMIN — Medication SCH EA: at 08:53

## 2020-06-14 RX ADMIN — Medication SCH MG: at 08:53

## 2020-06-14 NOTE — DIAGNOSTIC IMAGING REPORT
EXAM:

  CT Head Without Intravenous Contrast

 

CLINICAL HISTORY:

  AMS

 

TECHNIQUE:

  Axial computed tomography images of the head/brain without intravenous 

contrast.  CTDI is 53 mGy and DLP is 939 mGy-cm.  One or more of the 

following dose reduction techniques were used: automated exposure control,

 adjustment of the mA and/or kV according to patient size, use of 

iterative reconstruction technique.

 

COMPARISON:

  6/21/2019

 

FINDINGS:

  Brain:  No hemorrhage, extra-axial fluid collection, mass effect, or 

edema.

  Ventricles:  Unremarkable.

  Bones/joints:  Unremarkable.

  Soft tissues:  Unremarkable.

  Sinuses:  Unremarkable as visualized.

  Mastoid air cells:  Partial left mastoid effusion.

 

IMPRESSION:     

  No acute abnormality.

## 2020-06-14 NOTE — NUR
NURSE NOTES:Handoff received from ÓSCAR Lopez. Patient received awake and alert and able to 
make needs known, patient denies pain at this time. Patient IV site is clean dry and intact, 
saline locked. Bed in the low and locked position, call light within reach, will continue to 
monitor patient.

## 2020-06-14 NOTE — PULMONOLOGY PROGRESS NOTE
Subjective


ROS Limited/Unobtainable:  No


Allergies:  


Coded Allergies:  


     PENICILLINS (Unverified  Allergy, Unknown, 6/13/20)


Uncoded Allergies:  


     PENICILLIN (Allergy, Unknown, 6/21/19)





Objective





Last 24 Hour Vital Signs








  Date Time  Temp Pulse Resp B/P (MAP) Pulse Ox O2 Delivery O2 Flow Rate FiO2


 


6/14/20 16:00 97.7 87 20 142/92 (109) 96   


 


6/14/20 16:00  83      


 


6/14/20 12:00 99.9 89 18 130/83 (99) 96   


 


6/14/20 12:00  82      


 


6/14/20 08:45      Room Air  


 


6/14/20 08:00 97.9 81 19 147/87 (107) 96   


 


6/14/20 08:00  93      


 


6/14/20 04:00 97.7 65 22 132/89 (103) 95   


 


6/14/20 04:00  92      


 


6/14/20 02:15      Room Air  


 


6/14/20 00:00 97.7 100 22 125/84 (98) 94   


 


6/14/20 00:00  96      


 


6/13/20 21:30 97.7 102 22 128/92 (104) 95   


 


6/13/20 20:45 98.2 100 19 105/75 96 Room Air  


 


6/13/20 19:12  101 21 110/77 94 Room Air  


 


6/13/20 17:35  105 20   Room Air  


 


6/13/20 17:35 98.4 104 20 124/74 98 Room Air  


 


6/13/20 17:13 98.1 120 20 127/73 (91) 99 Room Air  

















Intake and Output  


 


 6/13/20 6/14/20





 19:00 07:00


 


Intake Total 1056 ml 1050 ml


 


Output Total  1200 ml


 


Balance 1056 ml -150 ml


 


  


 


Intake Oral  800 ml


 


IV Total 1056 ml 250 ml


 


Output Urine Total  1200 ml


 


# Voids 1 








Laboratory Tests


6/13/20 17:20: 


White Blood Count 6.9, Red Blood Count 5.34, Hemoglobin 16.8, Hematocrit 51.3, 

Mean Corpuscular Volume 96, Mean Corpuscular Hemoglobin 31.5H, Mean Corpuscular 

Hemoglobin Concent 32.9, Red Cell Distribution Width 13.3, Platelet Count 510H, 

Mean Platelet Volume 5.7L, Neutrophils (%) (Auto) 49.4, Lymphocytes (%) (Auto) 

41.6, Monocytes (%) (Auto) 5.5, Eosinophils (%) (Auto) 2.4, Basophils (%) (Auto

) 1.2, Prothrombin Time 10.7, Prothromb Time International Ratio 1.0, Activated 

Partial Thromboplast Time 27, Sodium Level 140, Potassium Level 3.0L, Chloride 

Level 102, Carbon Dioxide Level 20L, Anion Gap 18H, Blood Urea Nitrogen 6L, 

Creatinine 0.9, Estimat Glomerular Filtration Rate > 60, Glucose Level 131H, 

Calcium Level 8.1L, Magnesium Level 2.0, Total Bilirubin 0.2, Aspartate Amino 

Transf (AST/SGOT) 38H, Alanine Aminotransferase (ALT/SGPT) 56, Alkaline 

Phosphatase 61, Troponin I 0.002, Total Protein 8.0, Albumin 3.5, Globulin 4.5, 

Albumin/Globulin Ratio 0.8L, Lipase 138


6/13/20 18:26: Lactic Acid Level 2.00


6/13/20 18:27: 


Urine Color Pale yellow, Urine Appearance Clear, Urine pH 5, Urine Specific 

Gravity 1.010, Urine Protein Negative, Urine Glucose (UA) Negative, Urine 

Ketones Negative, Urine Blood Negative, Urine Nitrite Negative, Urine Bilirubin 

Negative, Urine Urobilinogen Normal, Urine Leukocyte Esterase Negative


6/13/20 18:29: 


Venous Blood pH 7.372, Venous Blood Partial Pressure CO2 27.0, Venous Blood 

Partial Pressure O2 104.6, Venous Blood HCO3 15.3, Venous Blood Total Carbon 

Dioxide 27.0, Venous Blood Base Excess -7.9, Venous Blood Carboxyhemoglobin 0.0L

, Methemoglobin 0.5


6/14/20 07:05: 


White Blood Count 10.4#, Red Blood Count 5.04, Hemoglobin 15.7, Hematocrit 49.5

, Mean Corpuscular Volume 98, Mean Corpuscular Hemoglobin 31.2H, Mean 

Corpuscular Hemoglobin Concent 31.8L, Red Cell Distribution Width 12.9, 

Platelet Count 448, Mean Platelet Volume 5.5L, Neutrophils (%) (Auto) 74.0, 

Lymphocytes (%) (Auto) 18.4L, Monocytes (%) (Auto) 5.4, Eosinophils (%) (Auto) 

1.3, Basophils (%) (Auto) 0.9, Sodium Level 139, Potassium Level 4.2, Chloride 

Level 104, Carbon Dioxide Level 21, Anion Gap 14, Blood Urea Nitrogen 9, 

Creatinine 1.0, Estimat Glomerular Filtration Rate > 60, Glucose Level 101, 

Calcium Level 7.7L, Magnesium Level 2.3, Total Bilirubin 0.4, Aspartate Amino 

Transf (AST/SGOT) 36, Alanine Aminotransferase (ALT/SGPT) 52, Alkaline 

Phosphatase 56, Total Protein 7.4, Albumin 3.3L, Globulin 4.1, Albumin/Globulin 

Ratio 0.8L





Current Medications








 Medications


  (Trade)  Dose


 Ordered  Sig/Steven


 Route


 PRN Reason  Start Time


 Stop Time Status Last Admin


Dose Admin


 


 Acetaminophen


  (Tylenol)  500 mg  Q6H  PRN


 ORAL


 Mild Pain (1-3) or temp>100.4   6/13/20 22:00


 7/13/20 21:59   


 


 


 Heparin Sodium


  (Porcine)


  (Heparin 5000


 units/ml)  5,000 units  EVERY 12  HOURS


 SUBQ


   6/14/20 09:00


 7/29/20 08:59  6/14/20 08:55


 


 


 Levofloxacin  150 ml @ 


 100 mls/hr  Q24H


 IVPB


   6/13/20 23:00


 6/20/20 22:59  6/13/20 23:55


 


 


 Lorazepam


  (Ativan 2mg/ml


 1ml)  1 mg  Q2H  PRN


 IV


 For Anxiety/ Agitation  6/13/20 22:00


 6/20/20 21:59   


 


 


 Metronidazole  100 ml @ 


 100 mls/hr  Q8H


 IVPB


   6/14/20 00:00


 6/21/20 00:00  6/14/20 16:14


 


 


 Multivitamins


 Therapeutic


  (Therapeutic


 Multivitamin)  1 ea  DAILY


 ORAL


   6/14/20 09:00


 7/14/20 08:59  6/14/20 08:53


 


 


 Ondansetron HCl


  (Zofran)  4 mg  Q6H  PRN


 IVP


 Nausea & Vomiting  6/13/20 22:00


 7/13/20 21:59   


 


 


 Pantoprazole


  (Protonix)  40 mg  DAILY


 ORAL


   6/14/20 09:00


 7/14/20 08:59  6/14/20 08:53


 


 


 Thiamine HCl


  (Vitamin B1)  100 mg  DAILY


 ORAL


   6/14/20 09:00


 7/14/20 08:59  6/14/20 08:53


 











Assessment/Plan


Assessment/Plan


Progress Note





HPI


Patient is a 49-year-old man with past medical history of alcohol abuse 

admitted for with possible alcohol withdrawal, family concern about amount he 

is drinking. He admits to drinking 12 beers a day as well as drinking tequila.  

He states his last drink was earlier this morning where he drank some tequila.  

He feels anxious and has palpitations.  He states that he feels a little shaky.

  He denies any seizure-like activity.  Patient also states that he fell 

yesterday.  He states that he fell because he was drinking and hit his head.  

Patient denies any chest pain or shortness of breath.  He denies any focal 

weakness.





Allergies:  


Coded Allergies:  


     PENICILLINS (Unverified  Allergy, Unknown, 6/13/20)


Uncoded Allergies:  


     PENICILLIN (Allergy, Unknown, 6/21/19)





Physical Exam





Vital Signs Noted





General Appearance:  no apparent distress, alert, GCS 15, non-toxic


Head:  normocephalic, atraumatic


Eyes:  bilateral eye normal inspection, bilateral eye PERRL


ENT:  hearing grossly normal, normal pharynx, normal voice, dry mucus membranes


Neck:  full range of motion, supple/symm/no masses/no LN


Respiratory:  chest non-tender, lungs clear, normal breath sounds, speaking 

full sentences


Cardiovascular:  tachycardia, HS1, HS2 normal, RRR


Gastrointestinal:  normal bowel sounds, non tender, soft, non-distended, no 

guarding, no rebound, no CVA tenderness


Musculoskeletal:  normal inspection, normal range of motion


Neurologic:  CNs III-XII nml as tested, oriented x3, other - faint tremors


Skin:  no rash





Impression:  


 Alcoholic withdrawal,ketoacidosis


 Hypokalemia


 Possible Pneumonia


 


Plan


IV Antibiotics


Thiamine


IV fluids, banana bag


PRN  Ativan


Monitor labs


PPX


O2 PRN





Laboratory Tests








Test


  6/13/20


17:20 6/13/20


18:29


 


White Blood Count


  6.9 K/UL


(4.8-10.8) 


 


 


Red Blood Count


  5.34 M/UL


(4.70-6.10) 


 


 


Hemoglobin


  16.8 G/DL


(14.2-18.0) 


 


 


Hematocrit


  51.3 %


(42.0-52.0) 


 


 


Mean Corpuscular Volume 96 FL (80-99)   


 


Mean Corpuscular Hemoglobin


  31.5 PG


(27.0-31.0)  H 


 


 


Mean Corpuscular Hemoglobin


Concent 32.9 G/DL


(32.0-36.0) 


 


 


Red Cell Distribution Width


  13.3 %


(11.6-14.8) 


 


 


Platelet Count


  510 K/UL


(150-450)  H 


 


 


Mean Platelet Volume


  5.7 FL


(6.5-10.1)  L 


 


 


Neutrophils (%) (Auto)


  49.4 %


(45.0-75.0) 


 


 


Lymphocytes (%) (Auto)


  41.6 %


(20.0-45.0) 


 


 


Monocytes (%) (Auto)


  5.5 %


(1.0-10.0) 


 


 


Eosinophils (%) (Auto)


  2.4 %


(0.0-3.0) 


 


 


Basophils (%) (Auto)


  1.2 %


(0.0-2.0) 


 


 


Prothrombin Time


  10.7 SEC


(9.30-11.50) 


 


 


Prothrombin Time INR 1.0 (0.9-1.1)   


 


Activated Partial


Thromboplast Time 27 SEC (23-33)


  


 


 


Sodium Level


  140 MMOL/L


(136-145) 


 


 


Potassium Level


  3.0 MMOL/L


(3.5-5.1)  L 


 


 


Chloride Level


  102 MMOL/L


() 


 


 


Carbon Dioxide Level


  20 MMOL/L


(21-32)  L 


 


 


Anion Gap


  18 mmol/L


(5-15)  H 


 


 


Blood Urea Nitrogen


  6 mg/dL (7-18)


L 


 


 


Creatinine


  0.9 MG/DL


(0.55-1.30) 


 


 


Estimated Glomerular


Filtration Rate > 60 mL/min


(>60) 


 


 


Glucose Level


  131 MG/DL


()  H 


 


 


Calcium Level


  8.1 MG/DL


(8.5-10.1)  L 


 


 


Magnesium Level


  2.0 MG/DL


(1.8-2.4) 


 


 


Total Bilirubin


  0.2 MG/DL


(0.2-1.0) 


 


 


Aspartate Amino Transferase


(AST) 38 U/L (15-37)


H 


 


 


Alanine Aminotransferase (ALT)


  56 U/L (12-78)


  


 


 


Alkaline Phosphatase


  61 U/L


() 


 


 


Troponin I


  0.002 ng/mL


(0.000-0.056) 


 


 


Total Protein


  8.0 G/DL


(6.4-8.2) 


 


 


Albumin


  3.5 G/DL


(3.4-5.0) 


 


 


Globulin 4.5 g/dL   


 


Albumin/Globulin Ratio


  0.8 (1.0-2.7)


L 


 


 


Lipase


  138 U/L


() 


 


 


Venous Blood pH  Pending  


 


Venous Blood Partial Pressure


CO2 


  Pending  


 


 


Venous Blood Partial Pressure


O2 


  Pending  


 


 


Venous Blood HCO3  Pending  


 


Venous Blood Total Carbon


Dioxide 


  Pending  


 


 


Venous Blood Base Excess  Pending  


 


Venous Blood Carboxyhemoglobin  Pending  


 


Methemoglobin  Pending  








EKG:  sinus tachycardiac, no acute changes





Rhythm:  no PVC's, no ectopy, other - sinus tachycardia





AXR:  no soft tissue swelling, nonspecific bowel gas, no sbo


   Impression:  No acute disease





CXR: R Infrahilar atelectasis vs opacity











Curtis Brown MD Jun 14, 2020 17:09

## 2020-06-14 NOTE — NUR
NURSE NOTES:

Received report from Roddy RODRIGUEZ RN. Pt is in stable condition, denies pain at this time. Will 
continue plan of care and close monitoring.

## 2020-06-15 VITALS — DIASTOLIC BLOOD PRESSURE: 81 MMHG | SYSTOLIC BLOOD PRESSURE: 140 MMHG

## 2020-06-15 VITALS — DIASTOLIC BLOOD PRESSURE: 91 MMHG | SYSTOLIC BLOOD PRESSURE: 157 MMHG

## 2020-06-15 VITALS — SYSTOLIC BLOOD PRESSURE: 132 MMHG | DIASTOLIC BLOOD PRESSURE: 86 MMHG

## 2020-06-15 VITALS — DIASTOLIC BLOOD PRESSURE: 88 MMHG | SYSTOLIC BLOOD PRESSURE: 133 MMHG

## 2020-06-15 LAB
ADD MANUAL DIFF: NO
ALBUMIN SERPL-MCNC: 3.2 G/DL (ref 3.4–5)
ALBUMIN/GLOB SERPL: 0.8 {RATIO} (ref 1–2.7)
ALP SERPL-CCNC: 71 U/L (ref 46–116)
ALT SERPL-CCNC: 32 U/L (ref 12–78)
ANION GAP SERPL CALC-SCNC: 9 MMOL/L (ref 5–15)
AST SERPL-CCNC: 31 U/L (ref 15–37)
BASOPHILS NFR BLD AUTO: 0.8 % (ref 0–2)
BILIRUB SERPL-MCNC: 0.9 MG/DL (ref 0.2–1)
BUN SERPL-MCNC: 5 MG/DL (ref 7–18)
CALCIUM SERPL-MCNC: 8 MG/DL (ref 8.5–10.1)
CHLORIDE SERPL-SCNC: 102 MMOL/L (ref 98–107)
CO2 SERPL-SCNC: 26 MMOL/L (ref 21–32)
CREAT SERPL-MCNC: 0.9 MG/DL (ref 0.55–1.3)
EOSINOPHIL NFR BLD AUTO: 2.2 % (ref 0–3)
ERYTHROCYTE [DISTWIDTH] IN BLOOD BY AUTOMATED COUNT: 12.9 % (ref 11.6–14.8)
GLOBULIN SER-MCNC: 4 G/DL
HCT VFR BLD CALC: 49.5 % (ref 42–52)
HGB BLD-MCNC: 15.7 G/DL (ref 14.2–18)
LYMPHOCYTES NFR BLD AUTO: 33 % (ref 20–45)
MCV RBC AUTO: 99 FL (ref 80–99)
MONOCYTES NFR BLD AUTO: 6.2 % (ref 1–10)
NEUTROPHILS NFR BLD AUTO: 57.7 % (ref 45–75)
PLATELET # BLD: 389 K/UL (ref 150–450)
POTASSIUM SERPL-SCNC: 3.7 MMOL/L (ref 3.5–5.1)
RBC # BLD AUTO: 5.02 M/UL (ref 4.7–6.1)
SODIUM SERPL-SCNC: 137 MMOL/L (ref 136–145)
WBC # BLD AUTO: 7 K/UL (ref 4.8–10.8)

## 2020-06-15 RX ADMIN — HEPARIN SODIUM SCH UNITS: 5000 INJECTION INTRAVENOUS; SUBCUTANEOUS at 08:23

## 2020-06-15 RX ADMIN — Medication SCH EA: at 08:24

## 2020-06-15 RX ADMIN — Medication SCH MG: at 08:25

## 2020-06-15 NOTE — NUR
CASE MANAGEMENT:REVIEW



49 YR OLD MALE BIBA FROM HOME



CC: ALCOHOL INTOXICATION



SI; alcoholic ketoacidosis

ALCOHOL WITHDRAWAL. HYPOKALEMIA

98.1   120  20  127/73  99% ON RA

K-3.0     CO2-20   GLUCOSE+131  





IS: 1L NS BOLUS

IV KCL

PO KCL

IV ATIVAN

IV BANANA BAG

CT HEAD

 VENOUS DUPLEX

**: TO TELEMETRY 



6/14/20

ALCOHOLIC KETOACIDOSIS

ALCOHOL WITHDRAWAL. HYPOKALEMIA

98.1   120  20  127/73  99% ON RA

K-3.0     CO2-20   GLUCOSE+131  





IS: IV FLAGYL Q8HRS

IV LEVAQUIN Q24

THIAMINE PO QD

MVI PO QD

HEPARIN SQ Q12

PROTONIX PO QD

ATIVAN PO Q6HRS PRN

**: TO TELEMETRY 



PLAN:

CONTINUOUS CARDIAC MONITORING





6/15/20

DISCHARGE HOME

## 2020-06-15 NOTE — NUR
NURSE NOTES:

Patient discharged. All belongings with patient including wallet, gold chain and cell phone. 
Discharge paperwork given with discharge instructions. No questions at this time. Cardiac 
monitor and IV discontinued. Patient walked down to front of hospital to private vehicle.

-------------------------------------------------------------------------------

Addendum: 06/15/20 at 1506 by WANDA ONEILL RN

-------------------------------------------------------------------------------

Patient urged to seek counseling as patient stated that he was grieving due to loss of 
mother, father and sibling which prompted his 3 day binge drinking. Patient also educated 
that drinking is not a healthy coping mechanism as it can lead to alcohol addiction and 
health issues. Patient verbalized understanding and stated he will follow up with his 
primary care physician.

## 2020-06-15 NOTE — CONSULTATION
DATE OF CONSULTATION:  06/14/2020

CONSULTING PHYSICIAN:  Vidhya Gay MD.



HISTORY OF PRESENT ILLNESS:  This is a 49-year-old man with a history of

alcohol abuse who has been admitted to the hospital due to alcohol

withdrawal.  Patient is drinking 12 packs of beers on a daily basis as

well as Tequila.  Patient is having anxiety, palpitation.  Patient has not

been taking any Ativan for his withdrawal symptoms.  He is on folate and

thiamine.  He is denying any depressive symptoms.  No suicidal ideation.



PAST PSYCHIATRIC HISTORY:  History of depression and anxiety.  No suicidal

attempt.  No psychiatric hospitalization.



PAST MEDICAL HISTORY:  None known.



ALLERGIES:  No known drug allergies.



SUBSTANCE ABUSE HISTORY:  Alcohol on a regular basis.



MENTAL STATUS EXAMINATION:  Alert, oriented times self, place, situation.

Mood is anxious.  Affect is constricted, congruent with mood.  Thought

process is concrete.  Thought content, no suicidal or homicidal ideation.

Cognition is intact.  Insight and judgment is fair.



ASSESSMENT:

Axis I  Alcohol dependence, alcohol withdrawal.

Axis II  Deferred.

Axis III  As above.

Axis IV  Low.

Axis V  50.



PLAN:

1. Change the IV Ativan to p.o.

2. Folate and thiamine.

3. Patient will benefit from inpatient substance abuse/alcohol

rehabilitation.

4. Patient is not an imminent danger to self or others.









  ______________________________________________

  Vidhya Gay M.D.





DR:  GEREMIAS

D:  06/14/2020 22:14

T:  06/14/2020 23:49

JOB#:  [id number]

CC:

## 2020-06-15 NOTE — NUR
NURSE NOTES:

Patient received from Margo CANTRELL. Patient stable AOx4 with no complaints and no s/sx of 
distress or pain. RR even and unlabored on RA. Side rails upx2, call light within reach, bed 
low and locked.

## 2020-06-15 NOTE — GENERAL PROGRESS NOTE
Assessment/Plan


Assessment/Plan:





Impression:  


 Alcoholic withdrawal,ketoacidosis


 Hypokalemia


 Possible Pneumonia


 


Plan


IV Antibiotics


recheck CXR


Thiamine


IV fluids, banana bag


PRN  Ativan


Monitor labs


psych follow up 


O2 PRN





Subjective


Allergies:  


Coded Allergies:  


     PENICILLINS (Unverified  Allergy, Unknown, 6/13/20)


Uncoded Allergies:  


     PENICILLIN (Allergy, Unknown, 6/21/19)


Subjective


care noted and reviewed


no distress





Objective





Last 24 Hour Vital Signs








  Date Time  Temp Pulse Resp B/P (MAP) Pulse Ox O2 Delivery O2 Flow Rate FiO2


 


6/15/20 04:00  78      


 


6/15/20 04:00 98.5 83 20 133/88 (103) 96   


 


6/15/20 00:00 99.0 87 20 132/86 (101) 97   


 


6/14/20 23:59  84      


 


6/14/20 21:00      Room Air  


 


6/14/20 20:00  80      


 


6/14/20 20:00 98.7 92 20 147/96 (113) 96   


 


6/14/20 16:00 97.7 87 20 142/92 (109) 96   


 


6/14/20 16:00  83      


 


6/14/20 12:00 99.9 89 18 130/83 (99) 96   


 


6/14/20 12:00  82      


 


6/14/20 08:45      Room Air  

















Intake and Output  


 


 6/14/20 6/15/20





 19:00 07:00


 


Intake Total 140 ml 300 ml


 


Output Total 1200 ml 


 


Balance -1060 ml 300 ml


 


  


 


Intake Oral 140 ml 300 ml


 


Output Urine Total 1200 ml 


 


# Voids 3 3








Laboratory Tests


6/15/20 05:45: 


White Blood Count 7.0, Red Blood Count 5.02, Hemoglobin 15.7, Hematocrit 49.5, 

Mean Corpuscular Volume 99, Mean Corpuscular Hemoglobin 31.3H, Mean Corpuscular 

Hemoglobin Concent 31.8L, Red Cell Distribution Width 12.9, Platelet Count 389, 

Mean Platelet Volume 6.0L, Neutrophils (%) (Auto) 57.7, Lymphocytes (%) (Auto) 

33.0, Monocytes (%) (Auto) 6.2, Eosinophils (%) (Auto) 2.2, Basophils (%) (Auto

) 0.8, Sodium Level 137, Potassium Level 3.7, Chloride Level 102, Carbon 

Dioxide Level 26, Anion Gap 9, Blood Urea Nitrogen 5L, Creatinine 0.9, Estimat 

Glomerular Filtration Rate > 60, Glucose Level 99, Calcium Level 8.0L, Total 

Bilirubin 0.9, Aspartate Amino Transf (AST/SGOT) 31, Alanine Aminotransferase (

ALT/SGPT) 32, Alkaline Phosphatase 71, Total Protein 7.2, Albumin 3.2L, 

Globulin 4.0, Albumin/Globulin Ratio 0.8L


Height (Feet):  5


Height (Inches):  8.00


Weight (Pounds):  170


Objective


General Appearance:  no apparent distress, alert


Head:  normocephalic, atraumatic


Respiratory:  chest non-tender, lungs clear, normal breath sounds 


Cardiovascular:  HS1, HS2 normal, RRR


Gastrointestinal:  normal bowel sounds, non tender, soft, non-distended, 


Musculoskeletal:  normal inspection, normal range of motion


Neurologic: mild tremors











Seb Lee MD Jose Alberto 15, 2020 08:16

## 2020-06-15 NOTE — NUR
NURSE NOTES:

Per patient, he does not drink on a daily basis. He recently lost his mother, father and 
sibling and had just gone on a 3 day drinking binge because he felt depressed.

## 2020-06-16 NOTE — DISCHARGE SUMMARY
Discharge Summary


Discharge Summary


_


DATE OF ADMISSION: 6/13/2020





DATE OF DISCHARGE: 6/15/2020 








DISCHARGED BY: Dr. Lee





REASON FOR ADMISSION: 


49 years old male with past medical history of alcohol abuse,  was brought by 

paramedics for evaluation.  


Family called paramedics , because patient was drinking daily,  and they were 

concerned about his nutritional status.  


Patient admitted to drinking 12 beers a day as well as drinking tequila.  


Last drink was earlier that morning , when he drank some tequila.  


Patient reported being feeling anxious , with palpitations , shaky and 

tremulous.  


He denied seizure-like activities.  


Patient reported that he fell the day prior,  because he was drunk and hit his 

head.  


No chest pain or shortness of breath.


No any focal focal weakness .


Upon evaluation patient was tachycardic with   heart rate 120 , otherwise vital 

signs were stable.  


EKG revealed sinus tachycardia,  no acute ischemic changes.  


Chest x-ray revealed  atelectasis versus airspace opacity in the right 

infrahilar region. 


 


Laboratory work-up revealed no leukocytosis ,stable hemoglobin, hematocrit ,

platelet count 510.  


Potassium 3.0.


Anion gap 18.  


Glucose 131.  


Lactic acid 2.0.  


Magnesium 2.0.  


AST 38 , ALT 56. 


Troponin  0.002 


Albumin 3.5.


INR 1.0 


ABG on room air was stable .


Urinalysis revealed no evidence of urinary tract infection.  


CT of the head revealed no acute intracranial pathology.  


Patient subsequently admitted for further management.





CONSULTANTS:


psychiatrist   





[facility] COURSE: 


Patient admitted to telemetry floor .


Patient  provided  n IV fluids with  multivitamin , magnesium and folate acid / 

banana bag.  


Thiamine provided.  


Potassium was replaced.  


Anxiolytic were on board as needed .


CIWA protocol initiated .


Seizure precautions maintained. 


DVT and GI prophylaxis provided.  


Patient was on empiric antibiotics.


 


Patient slowly started on diet.  


Antiemetic were on board as needed.  


LFT  and lipase remained stable.  


Patient was able to tolerate diet.  





Psychiatrist  seen and evaluated patient.  


Patient had alcohol dependency per psychiatrist.  


Ativan was changed to oral.  


Psychiatrist recommended inpatient substance abuse/alcohol rehabilitation.  


Patient was not at imminent danger to self or others.  





Patient clinically stabilized and was ready for discharge.  











FINAL DIAGNOSES: 


Alcohol dependency 


Alcohol withdrawal ketoacidosis


Hypokalemia


Possible pneumonia





DISCHARGE MEDICATIONS:


See Medication Reconciliation list.





DISCHARGE INSTRUCTIONS:


Patient was discharged home .


Follow-up with a primary care provider in 1 week..


Patient was strongly advised  on alcohol cessation.





I have been assigned to dictate discharge summary for this account. 


I was not involved in the patient's management.











Chasidy Fernandes NP Jun 16, 2020 14:23